# Patient Record
Sex: MALE | Race: WHITE | NOT HISPANIC OR LATINO | Employment: FULL TIME | URBAN - METROPOLITAN AREA
[De-identification: names, ages, dates, MRNs, and addresses within clinical notes are randomized per-mention and may not be internally consistent; named-entity substitution may affect disease eponyms.]

---

## 2023-05-25 ENCOUNTER — OFFICE VISIT (OUTPATIENT)
Dept: URGENT CARE | Facility: CLINIC | Age: 43
End: 2023-05-25
Payer: COMMERCIAL

## 2023-05-25 VITALS
HEART RATE: 83 BPM | RESPIRATION RATE: 20 BRPM | TEMPERATURE: 98 F | SYSTOLIC BLOOD PRESSURE: 131 MMHG | OXYGEN SATURATION: 91 % | WEIGHT: 315 LBS | BODY MASS INDEX: 42.66 KG/M2 | HEIGHT: 72 IN | DIASTOLIC BLOOD PRESSURE: 80 MMHG

## 2023-05-25 DIAGNOSIS — R09.02 HYPOXEMIA: ICD-10-CM

## 2023-05-25 DIAGNOSIS — Z76.89 ENCOUNTER TO ESTABLISH CARE: ICD-10-CM

## 2023-05-25 DIAGNOSIS — R05.9 COUGH, UNSPECIFIED TYPE: Primary | ICD-10-CM

## 2023-05-25 LAB
CTP QC/QA: YES
SARS-COV-2 RDRP RESP QL NAA+PROBE: NEGATIVE

## 2023-05-25 PROCEDURE — 99213 OFFICE O/P EST LOW 20 MIN: CPT | Mod: 25,,,

## 2023-05-25 PROCEDURE — 87635 SARS-COV-2 COVID-19 AMP PRB: CPT | Mod: QW,,,

## 2023-05-25 PROCEDURE — 94640 AIRWAY INHALATION TREATMENT: CPT | Mod: ,,,

## 2023-05-25 PROCEDURE — 94640 PR INHAL RX, AIRWAY OBST/DX SPUTUM INDUCT: ICD-10-PCS | Mod: ,,,

## 2023-05-25 PROCEDURE — 87635: ICD-10-PCS | Mod: QW,,,

## 2023-05-25 PROCEDURE — 99213 PR OFFICE/OUTPT VISIT, EST, LEVL III, 20-29 MIN: ICD-10-PCS | Mod: 25,,,

## 2023-05-25 RX ORDER — IPRATROPIUM BROMIDE AND ALBUTEROL SULFATE 2.5; .5 MG/3ML; MG/3ML
3 SOLUTION RESPIRATORY (INHALATION)
Status: COMPLETED | OUTPATIENT
Start: 2023-05-25 | End: 2023-05-25

## 2023-05-25 RX ADMIN — IPRATROPIUM BROMIDE AND ALBUTEROL SULFATE 3 ML: 2.5; .5 SOLUTION RESPIRATORY (INHALATION) at 06:05

## 2023-05-25 NOTE — PROGRESS NOTES
Subjective:      Patient ID: Ray Waggoner is a 43 y.o. male.    Vitals:  height is 6' (1.829 m) and weight is 158.8 kg (350 lb) (abnormal). His oral temperature is 98.3 °F (36.8 °C). His blood pressure is 131/80 and his pulse is 83. His respiration is 20 and oxygen saturation is 91% (abnormal).     Chief Complaint: Cough (Cough, chest congestion, x 2 weeks, requests referral to Dr Granda)    A 42 y/o male presents to the clinic with c/o chest congestion and tightness, and cough x 2 weeks. He reports mild nasal congestion as well. He denies any known fever, body aches, chills, hx of asthma, wheezing, n/v/d, abdominal complaints, rash, difficulty swallowing, neck stiffness, or changes in intake or output.  He denies any known lung problems as well.       Cough  Associated symptoms include postnasal drip. Pertinent negatives include no fever, sore throat or wheezing.     Constitution: Negative for fever.   HENT:  Positive for congestion and postnasal drip. Negative for sore throat and trouble swallowing.    Respiratory:  Positive for cough. Negative for sputum production, wheezing and asthma.    Allergic/Immunologic: Negative for asthma.    Objective:     Physical Exam   Constitutional: He is oriented to person, place, and time. He appears well-developed. He is cooperative.  Non-toxic appearance. He does not appear ill. No distress.   HENT:   Head: Normocephalic and atraumatic.   Ears:   Right Ear: Hearing, tympanic membrane and external ear normal.   Left Ear: Hearing, tympanic membrane and external ear normal.   Nose: Congestion (clear pnd) present.   Mouth/Throat: Mucous membranes are normal. Posterior oropharyngeal erythema present.   Eyes: Conjunctivae and lids are normal.   Neck: Trachea normal and phonation normal. Neck supple. No edema present. No erythema present. No neck rigidity present.   Cardiovascular: Normal rate, regular rhythm and normal heart sounds.   Pulmonary/Chest: Effort normal and  breath sounds normal. No stridor. No respiratory distress. He has no decreased breath sounds. He has no wheezes. He has no rhonchi. He has no rales.   Abdominal: Normal appearance.   Neurological: He is alert and oriented to person, place, and time. He exhibits normal muscle tone.   Skin: Skin is warm, intact and not diaphoretic. Capillary refill takes less than 2 seconds.   Psychiatric: His speech is normal and behavior is normal. Mood normal.   Nursing note and vitals reviewed.       Previous History      Review of patient's allergies indicates:  No Known Allergies    Past Medical History:   Diagnosis Date    No known health problems      No current outpatient medications  Past Surgical History:   Procedure Laterality Date    FOOT SURGERY Left     KNEE SURGERY Left      Family History   Problem Relation Age of Onset    Hypertension Mother     Hypertension Father     Hypertension Brother        Social History     Tobacco Use    Smoking status: Every Day     Packs/day: 1.00     Types: Cigarettes    Smokeless tobacco: Never   Substance Use Topics    Alcohol use: Yes     Comment: occas    Drug use: Never        Physical Exam      Vital Signs Reviewed   /80   Pulse 83   Temp 98.3 °F (36.8 °C) (Oral)   Resp 20   Ht 6' (1.829 m)   Wt (!) 158.8 kg (350 lb)   SpO2 (!) 91%   BMI 47.47 kg/m²        Procedures    Procedures     Labs     Results for orders placed or performed in visit on 05/25/23   POCT COVID-19 Rapid Screening   Result Value Ref Range    POC Rapid COVID Negative Negative     Acceptable Yes        Assessment:     1. Cough, unspecified type    2. Encounter to establish care    3. Hypoxemia        Plan:       Cough, unspecified type  -     POCT COVID-19 Rapid Screening  -     albuterol-ipratropium 2.5 mg-0.5 mg/3 mL nebulizer solution 3 mL  -     X-Ray Chest PA And Lateral    Encounter to establish care  -     Ambulatory referral/consult to Family Practice    Hypoxemia    Covid  negative today   Chest xray: negative for any acute abnormality     Patients oxygen saturation remained 91-93% almost immediatly after a duobeb. There was no abnormality on xray and lungs are clear to auscultation. Patient was advised to go to the Emergency room for further imaging and monitoring due to c/o shortness of breath, and chest discomfort and hypoxemia. He was advised to go via EMS but refused and went POV. Verbalized understanding of risks.

## 2025-02-21 ENCOUNTER — OFFICE VISIT (OUTPATIENT)
Dept: FAMILY MEDICINE | Facility: CLINIC | Age: 45
End: 2025-02-21
Payer: COMMERCIAL

## 2025-02-21 ENCOUNTER — TELEPHONE (OUTPATIENT)
Dept: FAMILY MEDICINE | Facility: CLINIC | Age: 45
End: 2025-02-21

## 2025-02-21 VITALS
WEIGHT: 315 LBS | HEART RATE: 66 BPM | TEMPERATURE: 98 F | DIASTOLIC BLOOD PRESSURE: 89 MMHG | SYSTOLIC BLOOD PRESSURE: 171 MMHG | BODY MASS INDEX: 44.1 KG/M2 | OXYGEN SATURATION: 97 % | RESPIRATION RATE: 20 BRPM | HEIGHT: 71 IN

## 2025-02-21 DIAGNOSIS — Z00.00 ENCOUNTER FOR WELLNESS EXAMINATION: ICD-10-CM

## 2025-02-21 DIAGNOSIS — C43.31 MELANOMA OF NOSE: ICD-10-CM

## 2025-02-21 DIAGNOSIS — Z13.6 ENCOUNTER FOR LIPID SCREENING FOR CARDIOVASCULAR DISEASE: ICD-10-CM

## 2025-02-21 DIAGNOSIS — Z13.1 SCREENING FOR DIABETES MELLITUS: ICD-10-CM

## 2025-02-21 DIAGNOSIS — M79.89 MASS OF SOFT TISSUE OF FACE: ICD-10-CM

## 2025-02-21 DIAGNOSIS — K75.81 NASH (NONALCOHOLIC STEATOHEPATITIS): Primary | ICD-10-CM

## 2025-02-21 DIAGNOSIS — Z11.59 NEED FOR HEPATITIS C SCREENING TEST: ICD-10-CM

## 2025-02-21 DIAGNOSIS — F17.210 CIGARETTE NICOTINE DEPENDENCE WITHOUT COMPLICATION: ICD-10-CM

## 2025-02-21 DIAGNOSIS — E66.01 MORBID OBESITY: ICD-10-CM

## 2025-02-21 DIAGNOSIS — R91.1 LUNG NODULE SEEN ON IMAGING STUDY: ICD-10-CM

## 2025-02-21 DIAGNOSIS — Z13.220 ENCOUNTER FOR LIPID SCREENING FOR CARDIOVASCULAR DISEASE: ICD-10-CM

## 2025-02-21 DIAGNOSIS — I10 PRIMARY HYPERTENSION: ICD-10-CM

## 2025-02-21 PROBLEM — Z72.0 TOBACCO USER: Status: ACTIVE | Noted: 2025-02-21

## 2025-02-21 RX ORDER — OLMESARTAN MEDOXOMIL 40 MG/1
40 TABLET ORAL DAILY
Qty: 30 TABLET | Refills: 1 | Status: SHIPPED | OUTPATIENT
Start: 2025-02-21 | End: 2026-02-21

## 2025-02-21 NOTE — PROGRESS NOTES
Ray Mahmoodeaux  02/21/2025  [id number]    Mihaela Kaplan MD  Patient Care Team:  Mihaela Kaplan MD as PCP - General (Family Medicine)      Chief Complaint:  Chief Complaint   Patient presents with    Establish Care     Needs PCP       History of Present Illness:    44 y.o. male who presents today to establish care. He is morbidly obese with a fatty liver. No known pmh otherwise. CTA chest in 2023 did show a pulmonary nodule that met criteria for follow up. Patient was unaware and has had no follow up imaging.     He originally made the appt for a suspicious lesion to left nose. He did see derm who did a biopsy and was proven to be melanoma. He also has had a rapid growing soft tissue mass to left face that is compressing nose and  resulting in deviation and nasal obstruction. This is near the melanoma. He has an ent appt next week with Dr. Kedar Herrera.     Patient has uncontrolled and untreated htn since 2023. Most home readings are at goal but he does report fluctuations. Both parents have htn. He is asymptomatic.     Review of Systems  General: denies f/c, weight loss, night sweats, decreased appetite  Eye: denies blurred vision, changes in vision  Respiratory: denies sob, wheezing, cough  Cardiovascular: denies chest pain, palpitations, edema  Gastrointestinal: denies abdominal pain, n/v, constipation, diarrhea  Integumentary: denies rashes, pruritis    Past Medical History  Past Medical History:   Diagnosis Date    No known health problems        Medications  Medication List with Changes/Refills   New Medications    OLMESARTAN (BENICAR) 40 MG TABLET    Take 1 tablet (40 mg total) by mouth once daily.       Past Surgical History:   Procedure Laterality Date    FOOT SURGERY Left     KNEE SURGERY Left        SUBJECTIVE:  Health Maintenance  Health Maintenance Topics with due status: Not Due       Topic Last Completion Date    RSV Vaccine (Age 60+ and Pregnant patients) Not Due     Health  "Maintenance Due   Topic Date Due    Hepatitis C Screening  Never done    Lipid Panel  Never done    HIV Screening  Never done    TETANUS VACCINE  Never done    Pneumococcal Vaccines (Age 0-49) (1 of 2 - PCV) Never done    Hemoglobin A1c (Diabetic Prevention Screening)  Never done    Colorectal Cancer Screening  Never done    COVID-19 Vaccine (3 - 2024-25 season) 09/01/2024       Exam:  Vitals:    02/21/25 0744   BP: (!) 171/89   BP Location: Left forearm   Patient Position: Sitting   Pulse: 66   Resp: 20   Temp: 98.3 °F (36.8 °C)   TempSrc: Oral   SpO2: 97%   Weight: (!) 175.5 kg (387 lb)   Height: 5' 11" (1.803 m)     Weight: (!) 175.5 kg (387 lb)   Body mass index is 53.98 kg/m².      Physical Exam  Constitutional: NAD, alert, pleasant, obese  Respiratory: CTAB, no wheezes, rales or rhonchi. No accessory muscle use  Eyes: EOMI  Cardiovascular: RRR, No m/r/g. No JVD. No LE edema  Nose: nasal deviation to right with moderate sized firm left nasal mass  Integumentary: warm, dry, intact  Psych: AA&Ox3      ICD-10-CM ICD-9-CM   1. MERCADO (nonalcoholic steatohepatitis)  K75.81 571.8   2. Lung nodule seen on imaging study  R91.1 793.11   3. Cigarette nicotine dependence without complication  F17.210 305.1   4. Mass of soft tissue of face  M79.89 729.99   5. Encounter for wellness examination  Z00.00 V70.0   6. Encounter for lipid screening for cardiovascular disease  Z13.220 V77.91    Z13.6 V81.2   7. Screening for diabetes mellitus  Z13.1 V77.1   8. Primary hypertension  I10 401.9   9. Morbid obesity  E66.01 278.01   10. Melanoma of nose  C43.31 172.3   11. Need for hepatitis C screening test  Z11.59 V73.89       1. MERCADO (nonalcoholic steatohepatitis)  Overview:  Seen on CTA 5/2023. Liver is enlarged and fatty    Will check lfts, lipids    - exercise for 30-45 min a day, 5x a week  - eat a total of 1500 calories daily with less than 70 total carbohydrates daily  - use my fitness pal to log foods and track calories  - eat " lean meats like chicken, turkey, fish, lean ground beef. Avoid fried, fatty or processed foods.  - do not eat pasta, bread, rice, potatoes often        2. Lung nodule seen on imaging study  Overview:  6 mm x 5 mm noncalcified nodule in the lateral right lower lobe seen on chest cta 2023. Repeat scans were recommended, however, I do not see any other scans    He is a smoker and dad is  from lung cancer    Repeat ct chest  Smoking cessation encouraged    Orders:  -     CT Chest Without Contrast; Future; Expected date: 2025    3. Cigarette nicotine dependence without complication  Overview:  Smokes 1 ppd or a little more. Has tried to cut back to try and quit.     Smoking cessation encouraged      4. Mass of soft tissue of face  Overview:  Recently diagnosed with melanoma and will be seeing ent. Has a subcutaneous left sided mass that is firm in the nasal and has caused nasal deviation    F/u with ent      5. Encounter for wellness examination  -     CBC Auto Differential; Future; Expected date: 2025  -     Comprehensive Metabolic Panel; Future; Expected date: 2025  -     Lipid Panel; Future; Expected date: 2025  -     TSH; Future; Expected date: 2025  -     Hemoglobin A1C; Future; Expected date: 2025  -     Urinalysis; Future; Expected date: 2025  -     Hepatitis C Antibody; Future; Expected date: 2025    6. Encounter for lipid screening for cardiovascular disease  -     Lipid Panel; Future; Expected date: 2025    7. Screening for diabetes mellitus  -     Hemoglobin A1C; Future; Expected date: 2025    8. Primary hypertension  Overview:  Start olmesartan 40 mg daily    Labs and EKG at next visit  Recommend a low salt diet, weight loss and exercise  Avoid drinking too much caffeine  Take blood pressure medications 2-3 hours BEFORE every appointment so we can get an accurate reading in the office  Check your blood pressure twice a day and write it  down. Bring blood pressure log and blood pressure cuff to next visit  Call me with pressure more than 140/90 or less than 100/60      Orders:  -     olmesartan (BENICAR) 40 MG tablet; Take 1 tablet (40 mg total) by mouth once daily.  Dispense: 30 tablet; Refill: 1  -     EKG 12-lead; Future; Expected date: 02/21/2025  -     CBC Auto Differential; Future; Expected date: 02/21/2025  -     Comprehensive Metabolic Panel; Future; Expected date: 02/21/2025  -     Lipid Panel; Future; Expected date: 02/21/2025  -     TSH; Future; Expected date: 02/21/2025  -     Hemoglobin A1C; Future; Expected date: 02/21/2025  -     Urinalysis; Future; Expected date: 02/21/2025  -     Hepatitis C Antibody; Future; Expected date: 02/21/2025    9. Morbid obesity  Overview:  - exercise for 30-45 min a day, 5x a week  - eat a total of 1800 calories daily with less than 70 total carbohydrates daily  - use my fitness pal to log foods and track calories  - eat lean meats like chicken, turkey, fish, lean ground beef. Avoid fried, fatty or processed foods.  - do not eat pasta, bread, rice, potatoes often        10. Melanoma of nose    11. Need for hepatitis C screening test  -     Hepatitis C Antibody; Future; Expected date: 02/21/2025       F/u with dr edwards for mass of nose and melanoma  Request records and path from Dr. Coleman  Follow up: Follow up for 2-4 wks wellness [facility] labs and ekg.      Care Plan/Goals: Reviewed   Goals    None

## 2025-02-26 DIAGNOSIS — C43.31: Primary | ICD-10-CM

## 2025-03-03 ENCOUNTER — HOSPITAL ENCOUNTER (OUTPATIENT)
Dept: RADIOLOGY | Facility: HOSPITAL | Age: 45
Discharge: HOME | End: 2025-03-03
Attending: FAMILY MEDICINE
Payer: COMMERCIAL

## 2025-03-03 DIAGNOSIS — R91.1 LUNG NODULE SEEN ON IMAGING STUDY: ICD-10-CM

## 2025-03-03 PROCEDURE — 71250 CT THORAX DX C-: CPT | Mod: TC

## 2025-03-10 ENCOUNTER — RESULTS FOLLOW-UP (OUTPATIENT)
Dept: FAMILY MEDICINE | Facility: CLINIC | Age: 45
End: 2025-03-10

## 2025-03-13 ENCOUNTER — HOSPITAL ENCOUNTER (OUTPATIENT)
Dept: RADIOLOGY | Facility: HOSPITAL | Age: 45
Discharge: HOME | End: 2025-03-13
Attending: OTOLARYNGOLOGY
Payer: COMMERCIAL

## 2025-03-13 DIAGNOSIS — C43.31: ICD-10-CM

## 2025-03-13 PROCEDURE — 25500020 PHARM REV CODE 255: Performed by: OTOLARYNGOLOGY

## 2025-03-13 PROCEDURE — 78816 PET IMAGE W/CT FULL BODY: CPT | Mod: TC

## 2025-03-13 PROCEDURE — A9552 F18 FDG: HCPCS | Performed by: OTOLARYNGOLOGY

## 2025-03-13 PROCEDURE — 70491 CT SOFT TISSUE NECK W/DYE: CPT | Mod: TC

## 2025-03-13 RX ORDER — FLUDEOXYGLUCOSE F18 500 MCI/ML
10 INJECTION INTRAVENOUS
Status: COMPLETED | OUTPATIENT
Start: 2025-03-13 | End: 2025-03-13

## 2025-03-13 RX ADMIN — IOHEXOL 100 ML: 350 INJECTION, SOLUTION INTRAVENOUS at 10:03

## 2025-03-13 RX ADMIN — FLUDEOXYGLUCOSE F-18 10.1 MILLICURIE: 500 INJECTION INTRAVENOUS at 07:03

## 2025-03-19 ENCOUNTER — OFFICE VISIT (OUTPATIENT)
Dept: FAMILY MEDICINE | Facility: CLINIC | Age: 45
End: 2025-03-19
Payer: COMMERCIAL

## 2025-03-19 ENCOUNTER — E-CONSULT (OUTPATIENT)
Dept: CARDIOLOGY | Facility: CLINIC | Age: 45
End: 2025-03-19
Payer: COMMERCIAL

## 2025-03-19 ENCOUNTER — PATIENT MESSAGE (OUTPATIENT)
Dept: FAMILY MEDICINE | Facility: CLINIC | Age: 45
End: 2025-03-19

## 2025-03-19 VITALS
HEIGHT: 71 IN | HEART RATE: 75 BPM | RESPIRATION RATE: 12 BRPM | OXYGEN SATURATION: 96 % | TEMPERATURE: 98 F | WEIGHT: 315 LBS | SYSTOLIC BLOOD PRESSURE: 122 MMHG | BODY MASS INDEX: 44.1 KG/M2 | DIASTOLIC BLOOD PRESSURE: 68 MMHG

## 2025-03-19 DIAGNOSIS — R79.81 ELEVATED CARBON DIOXIDE LEVEL: ICD-10-CM

## 2025-03-19 DIAGNOSIS — C43.31 MELANOMA OF NOSE: ICD-10-CM

## 2025-03-19 DIAGNOSIS — R73.02 IMPAIRED GLUCOSE TOLERANCE: ICD-10-CM

## 2025-03-19 DIAGNOSIS — C43.30 MELANOMA OF FACE: ICD-10-CM

## 2025-03-19 DIAGNOSIS — Z01.810 PREOP CARDIOVASCULAR EXAM: Primary | ICD-10-CM

## 2025-03-19 DIAGNOSIS — K75.81 NASH (NONALCOHOLIC STEATOHEPATITIS): ICD-10-CM

## 2025-03-19 DIAGNOSIS — M25.50 POLYARTHRALGIA: ICD-10-CM

## 2025-03-19 DIAGNOSIS — Z12.11 COLON CANCER SCREENING: ICD-10-CM

## 2025-03-19 DIAGNOSIS — I10 PRIMARY HYPERTENSION: ICD-10-CM

## 2025-03-19 DIAGNOSIS — D75.1 POLYCYTHEMIA: ICD-10-CM

## 2025-03-19 DIAGNOSIS — E66.01 MORBID OBESITY: ICD-10-CM

## 2025-03-19 DIAGNOSIS — Z00.00 ENCOUNTER FOR WELLNESS EXAMINATION: Primary | ICD-10-CM

## 2025-03-19 DIAGNOSIS — F17.210 CIGARETTE NICOTINE DEPENDENCE WITHOUT COMPLICATION: ICD-10-CM

## 2025-03-19 DIAGNOSIS — G47.10 HYPERSOMNOLENCE: ICD-10-CM

## 2025-03-19 DIAGNOSIS — Z01.810 PREOP CARDIOVASCULAR EXAM: ICD-10-CM

## 2025-03-19 PROBLEM — M79.89 MASS OF SOFT TISSUE OF FACE: Status: RESOLVED | Noted: 2025-02-21 | Resolved: 2025-03-19

## 2025-03-19 NOTE — PROGRESS NOTES
Patient ID: [id number]     Chief Complaint: Annual Exam (Wellness w/ labs and EKG/Sx Clearance)        HPI:     Ray Waggoner is a 44 y.o. male here today for an annual wellness. Reviewed and discussed lab results. Overall he feels well. No other complaints today. He is also in need of a preop exam to have wide local excision of nasal melanoma with Dr. Herrera. EKG did show right axis deviation. He is asymptomatic.     Preop exam  Active CP-NO  Heart Failure-NO  Valvular Disease-NO  History of CVA-NO  DM? On Insulin? NO  Serum Creatinine >(2.0)-NO    ?Can take care of self, such as eat, dress, or use the toilet (1 MET)-Yes  ?Can walk up a flight of steps or a hill or walk on level ground at 3 to 4 mph (4 METs)-Yes      As a separate em visit, patient c/o polyarthralgias that is worsening over the years. C/o pain in bilateral shoulders, elbows, knees and ankles. No signficant fh.     Labs discussed. Glucose 120. Also has polycythemia, leukocytosis, elevated co2 level. This correlates with likely JULIETTE. He does endorse snoring with frequent nocturnal wakings. BMI 54.        3/19/2025   EPWORTH SLEEPINESS SCALE   Sitting and reading 2   Watching TV 3   Sitting, inactive in a public place (e.g. a theatre or a meeting) 0   As a passenger in a car for an hour without a break 0   Lying down to rest in the afternoon when circumstances permit 3   Sitting and talking to someone 0   Sitting quietly after a lunch without alcohol 3   In a car, while stopped for a few minutes in traffic 1   Total score 12         -------------------------------------    No known health problems        Past Surgical History:   Procedure Laterality Date    FOOT SURGERY Left     KNEE SURGERY Left        Review of patient's allergies indicates:  No Known Allergies    Outpatient Medications Marked as Taking for the 3/19/25 encounter (Office Visit) with Mihaela Kaplan MD   Medication Sig Dispense Refill    olmesartan (BENICAR) 40 MG  "tablet Take 1 tablet (40 mg total) by mouth once daily. 30 tablet 1       Social History[1]     Family History   Problem Relation Name Age of Onset    Hypertension Mother      COPD Father      Hypertension Father      Lung cancer Father      Hypertension Brother      Diabetes Maternal Grandmother      Diabetes Paternal Grandmother          Patient Care Team:  Mihaela Kaplan MD as PCP - General (Family Medicine)     Subjective:     ROS    See HPI for details    Constitutional: Denies Change in appetite. Denies Chills. Denies Fever. Denies Night sweats.  Eye: Denies Blurred vision. Denies Discharge. Denies Eye pain.  ENT: Denies Decreased hearing. Denies Sore throat. Denies Swollen glands.  Respiratory: Denies Cough. Denies Shortness of breath. Denies Shortness of breath with exertion. Denies Wheezing.  Cardiovascular: DeniesChest pain at rest. Denies Chest pain with exertion. Denies Irregular heartbeat. Denies Palpitations. Denies Edema.  Gastrointestinal: Denies Abdominal pain. DeniesDiarrhea. Denies Nausea. Denies Vomiting. Denies Hematemesis or Hematochezia.  Genitourinary: Denies Dysuria. Denies Urinary frequency. Denies Urinary urgency. Denies Blood in urine.  Integumentary: Denies Rash. Denies Itching. Denies Dry skin.  Psychiatric: Denies Depression. Denies Anxiety. Denies Suicidal/Homicidal ideations.    All Other ROS: Negative except as stated in HPI.       Objective:     /68 (BP Location: Right arm, Patient Position: Sitting)   Pulse 75   Temp 98.2 °F (36.8 °C) (Oral)   Resp 12   Ht 5' 11" (1.803 m)   Wt (!) 176.6 kg (389 lb 4.8 oz)   SpO2 96%   BMI 54.30 kg/m²     Physical Exam    General: Alert and oriented, No acute distress.  Head: Normocephalic, Atraumatic.  Eye: Pupils are equal, round and reactive to light, Extraocular movements are intact, Sclera non-icteric.  Ears/Nose/Throat: Tm+ light reflexes bilaterally. Normal, Mucosa moist,Clear. Teeth intact. NO lesions of " tongue, palate, mucosa. Mallampati 3  Respiratory: Clear to auscultation bilaterally; No wheezes, rales or rhonchi,Non-labored respirations, Symmetrical chest wall expansion.  Cardiovascular: Regular rate and rhythm, S1/S2 normal, No murmurs, rubs or gallops.  Gastrointestinal: Soft, Non-tender, Non-distended, Normal bowel sounds, No palpable organomegaly.  Integumentary: Warm, Dry, Intact, No suspicious lesions or rashes.  Extremities: No clubbing, cyanosis or edema  Psychiatric: Normal interaction, Coherent speech, Euthymic mood, Appropriate affect       Assessment:       ICD-10-CM ICD-9-CM   1. Encounter for wellness examination  Z00.00 V70.0   2. Hypersomnolence  G47.10 780.54   3. Elevated carbon dioxide level  R79.81 790.91   4. Impaired glucose tolerance  R73.02 790.22   5. Melanoma of face  C43.30 172.3   6. Preop cardiovascular exam  Z01.810 V72.81   7. Cigarette nicotine dependence without complication  F17.210 305.1   8. Morbid obesity  E66.01 278.01   9. Colon cancer screening  Z12.11 V76.51   10. Polycythemia  D75.1 238.4   11. Polyarthralgia  M25.50 719.49   12. Primary hypertension  I10 401.9   13. Melanoma of nose  C43.31 172.3   14. MERCADO (nonalcoholic steatohepatitis)  K75.81 571.8        Plan:     Refer to sleep medicine for HST. High likelihood of kelsi  I did discuss that this could lead to respiratory depression while under anesthesia.   EKG was abnormal so I will e-consult cardiology to make sure he can proceed with surgery  Discussed colon cancer screening options with patient. Patient verbalized understanding  Colonoscopy is the gold standard and is always recommended as first line for screening.   Cologuard is second line- has a 13% false positive rate and 4% false negative rate, meaning cancers can be missed up to 4% of the time and positives are not really positives 13% of the time  If you have any black or bloody stools or a family history of colon cancer or IBD, then cologuard is not for  you and you should have a colonoscopy    Patient is average risk for average risk surgery. There is a risk of respiratory depression with probably JULIETTE that is untreated. Will note this on clearance form  For polyarthralgias, I will get autoimmune work up started    Health Maintenance Topics with due status: Not Due       Topic Last Completion Date    Hemoglobin A1c (Prediabetes) 03/03/2025    Lipid Panel 03/03/2025    RSV Vaccine (Age 60+ and Pregnant patients) Not Due        AAA Screening - screening for abdominal aneurysms if you have ever smoked a cigarette    Prostate Cancer Screening - starting at age 45 for  men and 50 if . Start sooner if father/brother with prostate cancer    Colon Cancer Screening -  recommended starting at age 45 unless a first degree relative has/had colon cancer then may be needed sooner    Eye Exam - Recommend annually.     Dental Exam - Recommend biannual exams.     Vaccinations -   Immunization History   Administered Date(s) Administered    COVID-19, MRNA, LN-S, PF (Pfizer) (Purple Cap) 08/11/2021, 09/02/2021      Patient was counseled on risks/benefits of receiving immunization. All questions were answered    Problem List Items Addressed This Visit       Morbid obesity    Overview   - exercise for 30-45 min a day, 5x a week  - eat a total of 1800 calories daily with less than 70 total carbohydrates daily  - use my fitness pal to log foods and track calories  - eat lean meats like chicken, turkey, fish, lean ground beef. Avoid fried, fatty or processed foods.  - do not eat pasta, bread, rice, potatoes often           MERCADO (nonalcoholic steatohepatitis)    Overview   Seen on CTA 5/2023. Liver is enlarged and fatty    Will check lfts, lipids    - exercise for 30-45 min a day, 5x a week  - eat a total of 1500 calories daily with less than 70 total carbohydrates daily  - use my fitness pal to log foods and track calories  - eat lean meats like chicken, turkey,  fish, lean ground beef. Avoid fried, fatty or processed foods.  - do not eat pasta, bread, rice, potatoes often           Cigarette nicotine dependence without complication    Overview   Smokes 1 ppd or a little more. Has tried to cut back to try and quit.     Smoking cessation encouraged         Primary hypertension    Overview   Bp at goal on olmesartan 40 mg . Asymptomatic.    Continue current Rx meds  Refill olmesaratn    Recommend a low salt diet, weight loss and exercise  Avoid drinking too much caffeine  Take blood pressure medications 2-3 hours BEFORE every appointment so we can get an accurate reading in the office  Check your blood pressure twice a day and write it down. Bring blood pressure log and blood pressure cuff to next visit  Call me with pressure more than 140/90 or less than 100/60           Melanoma of nose    Impaired glucose tolerance    Overview   Adhere to a low carb/sugar diet that is also low in fat, but high in protein. Eat foods such as baked chicken, turkey, low fat ground beef, fish. Increase portions of vegetables. Avoid soft drinks, sweet drinks and stick to mainly water.     Will monitor a1c         Polycythemia    Overview   Refer for HST         Relevant Orders    Ambulatory referral/consult to Sleep Disorders     Other Visit Diagnoses         Encounter for wellness examination    -  Primary      Hypersomnolence        Relevant Orders    Ambulatory referral/consult to Sleep Disorders      Elevated carbon dioxide level        Relevant Orders    Ambulatory referral/consult to Sleep Disorders      Melanoma of face          Preop cardiovascular exam        Relevant Orders    E-Consult to General Cardiology      Colon cancer screening        Relevant Orders    Cologuard Screening (Multitarget Stool DNA)      Polyarthralgia        Relevant Orders    Rheumatoid Factors, IgA, IgG, IgM    ANTINUCLEAR ANTIBODIES COMPREHENSIVE PANEL    DSDNA            Exercise for a total of 150 min per  week and eat a healthy diet  Perform monthly self testicular exams to screen for testicular cancer  Stay up to date with all cancer screening discussed in visit  Immunizations due were discussed during visit  All health maintenance was reviewed with patient. Patient verbalized understanding. All questions were answered.     Medication List with Changes/Refills   Current Medications    OLMESARTAN (BENICAR) 40 MG TABLET    Take 1 tablet (40 mg total) by mouth once daily.       Start Date: 2/21/2025 End Date: 2/21/2026          The patient's Health Maintenance was reviewed and the following appears to be due at this time:   Health Maintenance Due   Topic Date Due    HIV Screening  Never done    TETANUS VACCINE  Never done    Pneumococcal Vaccines (Age 0-49) (1 of 2 - PCV) Never done    COVID-19 Vaccine (3 - Pfizer risk series) 09/30/2021    Colorectal Cancer Screening  Never done         No follow-ups on file. In addition to their scheduled follow up, the patient has also been instructed to follow up on as needed basis.              [1]  Social History  Socioeconomic History    Marital status:    Tobacco Use    Smoking status: Every Day     Current packs/day: 1.00     Average packs/day: 1 pack/day for 15.0 years (15.0 ttl pk-yrs)     Types: Cigarettes    Smokeless tobacco: Never   Substance and Sexual Activity    Alcohol use: Not Currently     Comment: occas    Drug use: Not Currently    Sexual activity: Yes     Partners: Female     Birth control/protection: None     Social Drivers of Health     Financial Resource Strain: Low Risk  (2/20/2025)    Overall Financial Resource Strain (CARDIA)     Difficulty of Paying Living Expenses: Not hard at all   Food Insecurity: No Food Insecurity (2/20/2025)    Hunger Vital Sign     Worried About Running Out of Food in the Last Year: Never true     Ran Out of Food in the Last Year: Never true   Transportation Needs: No Transportation Needs (2/20/2025)    KELSEA  - Transportation     Lack of Transportation (Medical): No     Lack of Transportation (Non-Medical): No   Physical Activity: Insufficiently Active (2/20/2025)    Exercise Vital Sign     Days of Exercise per Week: 3 days     Minutes of Exercise per Session: 30 min   Stress: Stress Concern Present (2/20/2025)    [employer] [place] [employer] - Occupational Stress Questionnaire     Feeling of Stress : Very much   Housing Stability: Low Risk  (2/20/2025)    Housing Stability Vital Sign     Unable to Pay for Housing in the Last Year: No     Number of Times Moved in the Last Year: 0     Homeless in the Last Year: No

## 2025-03-19 NOTE — CONSULTS
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  PMH HTN and obesity  EKG reviewed by myself today reveals NSR right axial deviation  Pt is asymptomatic.    Plan  Elevated periop risk of CV events for non-high risk procedure.  Ok to proceed the scheduled procedure without further cardiac study.      Total time of Consultation: 10 minute    I did not speak to the requesting provider verbally about this.     *This eConsult is based on the clinical data available to me and is furnished without benefit of a physical examination. The eConsult will need to be interpreted in light of any clinical issues or changes in patient status not available to me at the time of filing this eConsults. Significant changes in patient condition or level of acuity should result in immediate formal consultation and reevaluation. Please alert me if you have further questions.    Thank you for this eConsult referral.     Mark Britt MD  [facility]

## 2025-03-19 NOTE — PATIENT INSTRUCTIONS
We understand that controlling diabetes can feel overwhelming at times. We are here to offer the tools and support to help you manage your diabetes and meet your health goals. Please reference the meal planning guide below.     Diabetic Meal Planning    About this topic  Healthy eating is an important part of keeping your diabetes in control. A balanced diet along with your diabetic drugs will help you control your blood sugar. The right portions of healthy foods may help keep your sugar within your goal range. It may also help to lower or maintain your weight, manage cholesterol, the amount of fat in your blood, and blood pressure.      Image(s)    What will the results be?  Healthy eating may help you lower your blood sugar and keep it in a safe range. Keeping your blood sugar in a safe range may lower your chances for long term problems from your diabetes. You may be less likely to have damage to your kidneys, heart, eyes, or nerves.    What changes to diet are needed?  Healthy eating means:  Eating a range of foods from all food groups.  Eating the right size portions. Read the nutrition facts on each food you eat.  Eating meals and snacks at the same time each day. Do not skip a meal or snack. Skipping meals may cause your blood sugar to go too low, especially if you are on drugs for your diabetes. Skipping meals can also cause you to eat too much at the next meal.  Talk to your diabetes educator about making a personal meal plan for you. They can also help you eat the foods you like by modifying them to be healthier.    Who should use this diet?  This diet is helpful to people with high blood sugar or diabetes.    What foods are good to eat?  It is important to make a healthy meal. Eat a variety of different foods in the right portion.  Fill half of your plate with non-starchy vegetables. Non-starchy vegetables include: Broccoli, green beans, carrots, greens (christophe, kale, mustard, turnip), onions, tomatoes,  asparagus, beets, cauliflower, celery, and cucumber. Non-starchy vegetables are high in fiber and low in carbohydrates. These will help keep you full for longer without raising your blood sugar.  Fill 1/4 of your plate with carbohydrates. Try to choose whole grain options. Whole-grain products have more fiber which will make you feel full. Carbohydrates include: Bread, rice, pasta, and starchy vegetables. Starchy vegetables include beans, potatoes, acorn squash, butternut squash, corn, and peas.  Fill 1/4 of your plate with protein. Choose low-fat cuts of meat like boneless, skinless chicken breast; pork loin; 90% lean beef; lean and skinless turkey meat; and fresh fish (not fried) such as tuna, salmon, or mackerel.  Add a low-fat or non-fat dairy product like 8 ounces (240 mL) of 1% or skim milk or 6 ounces (180 mL) of low-fat yogurt. Eat the recommended serving. Milk and yogurt have carbohydrates which raise your blood sugar.  Add a small piece of fruit or 1/2 cup (120 grams) of frozen or canned fruit. Choose canned fruits in juice or water. Fruits include apples, bananas, peaches, pears, pineapple, plums, and oranges. Eat the recommended serving. Fruit has carbohydrates which can raise your blood sugar.  Include healthy fats in your meal like olive oil, canola oil, avocado, and nuts.  Other good foods to include in your diet are:  Foods high in fiber. Adding fiber to your meals may help control your blood sugar and cholesterol levels. It may also help with weight loss and prevent belly problems. If you are younger than 50, it is recommended to get 25 to 38 grams per day. If you are older than 50, it is recommended to get 21 to 30 grams per day. Good sources of fiber include:  Nuts and seeds  Beans, peas, and other legumes  Whole-grain products  Fruits  Vegetables  Smart snacks in the right portion. Do not go too long in between meals. Ask your dietitian when you should have a snack in between your meals. Snack on  things like:  Nuts  Vegetables and low-fat dressing  Light popcorn  Low-fat cheese and crackers  1/2 sandwich    What foods should be limited or avoided?  You may need to limit the amount of some foods you eat and how often you eat them. Foods that should be limited include:  High fat or processed foods like:  [place]  Sausage  Hot dogs  Whole-fat dairy products  Potato chips  Foods high in sodium like:  Canned soups  Soy sauce and some salad dressings  Cured meats  Salted snack foods like pretzels  Olives  Fats and oils like:  Margarine  Salad dressing  Gravy  Lard or shortening  Foods high in sugar like:  Candy  Cookies  Cake  Ice cream  Table sugar  Soda  Juice drinks  Starches that are not whole grain like:  White rice  French fries  White pasta  White bread  Sugary cereals  Baked goods, pastries, croissants  Beer, wine, and mixed drinks (alcohol). Drink alcohol in moderation (1 drink per day or less for adult women and 2 drinks per day or less for adult men). Drinking alcohol can cause fluctuations in your blood sugar and interfere with how your diabetic drugs work. Talk to your doctor about how you can safely include alcohol into your diet.    What can be done to prevent this health problem?  Some people have a higher chance of developing diabetes than others. This is a life-long problem. You can still lead a normal life. Diabetes can be managed through diet, exercise, and drugs. It is important to have support from your family and friends to help you with your goals.    When do I need to call the doctor?  Blood sugar level is above 240 mg/dL for more than a day  Blood sugar level drops to less than 40 and does not respond to 15 grams of simple carbohydrate, like 4 glucose tablets or 1/2 cup (120 mL) of fruit juice  Trouble breathing  Very sleepy and trouble concentrating  Feeling very thirsty  Needing to urinate (pee) more than normal  Throw up more than once or have many loose stools  You are so sick you  cannot eat or drink  Fever over 101°F (38°C)  Questions about your diet plan  You are not feeling better in 2 to 3 days or you are feeling worse    Helpful tips  Plan ahead. Plan your meals and grocery list before going to the store. This will help you to choose foods that are good for you.  Pack a lunch. Take healthy meals and snacks with you to work.  Keep a food journal to help keep you on track. Take note of foods that keep your blood sugar in goal range. Also note foods and meals that raise or lower your blood sugar. There are apps for your phone that can help with this.  Visit a restaurant's website before eating out. You can see the menu options and nutritional facts. This way, you can see which items best fit into your diet plan. Call ahead if you have questions.    Disclaimer.This generalized information is a limited summary of diagnosis, treatment, and/or medication information. It is not meant to be comprehensive and should be used as a tool to help the user understand and/or assess potential diagnostic and treatment options. It does NOT include all information about conditions, treatments, medications, side effects, or risks that may apply to a specific patient. It is not intended to be medical advice or a substitute for the medical advice, diagnosis, or treatment of a health care provider based on the health care provider's examination and assessment of a patient's specific and unique circumstances. Patients must speak with a health care provider for complete information about their health, medical questions, and treatment options, including any risks or benefits regarding use of medications. This information does not endorse any treatments or medications as safe, effective, or approved for treating a specific patient. [employer] and its affiliates disclaim any warranty or liability relating to this information or the use thereof. The use of this information is governed by the Terms of Use,  available at Terms of Use. ©2022 [employer] and its affiliates and/or licensors. All rights reserved.

## 2025-08-18 ENCOUNTER — TELEPHONE (OUTPATIENT)
Dept: FAMILY MEDICINE | Facility: CLINIC | Age: 45
End: 2025-08-18
Payer: COMMERCIAL